# Patient Record
Sex: FEMALE | Race: WHITE | URBAN - METROPOLITAN AREA
[De-identification: names, ages, dates, MRNs, and addresses within clinical notes are randomized per-mention and may not be internally consistent; named-entity substitution may affect disease eponyms.]

---

## 2017-01-01 ENCOUNTER — HOSPITAL ENCOUNTER (EMERGENCY)
Facility: CLINIC | Age: 82
Discharge: HOME | End: 2017-10-03
Attending: EMERGENCY MEDICINE | Admitting: EMERGENCY MEDICINE
Payer: MEDICARE

## 2017-01-01 ENCOUNTER — TELEPHONE (OUTPATIENT)
Dept: FAMILY MEDICINE | Facility: CLINIC | Age: 82
End: 2017-01-01

## 2017-01-01 ENCOUNTER — OFFICE VISIT (OUTPATIENT)
Dept: FAMILY MEDICINE | Facility: CLINIC | Age: 82
End: 2017-01-01
Payer: MEDICARE

## 2017-01-01 ENCOUNTER — APPOINTMENT (OUTPATIENT)
Dept: CT IMAGING | Facility: CLINIC | Age: 82
End: 2017-01-01
Attending: EMERGENCY MEDICINE
Payer: MEDICARE

## 2017-01-01 ENCOUNTER — TRANSFERRED RECORDS (OUTPATIENT)
Dept: HEALTH INFORMATION MANAGEMENT | Facility: CLINIC | Age: 82
End: 2017-01-01

## 2017-01-01 VITALS
RESPIRATION RATE: 20 BRPM | HEIGHT: 59 IN | HEART RATE: 73 BPM | SYSTOLIC BLOOD PRESSURE: 190 MMHG | OXYGEN SATURATION: 99 % | WEIGHT: 125 LBS | BODY MASS INDEX: 25.2 KG/M2 | DIASTOLIC BLOOD PRESSURE: 64 MMHG | TEMPERATURE: 97.6 F

## 2017-01-01 VITALS
RESPIRATION RATE: 14 BRPM | HEART RATE: 67 BPM | WEIGHT: 122 LBS | HEIGHT: 59 IN | TEMPERATURE: 97 F | BODY MASS INDEX: 24.6 KG/M2 | DIASTOLIC BLOOD PRESSURE: 62 MMHG | SYSTOLIC BLOOD PRESSURE: 100 MMHG | OXYGEN SATURATION: 99 %

## 2017-01-01 DIAGNOSIS — Z79.899 ENCOUNTER FOR LONG-TERM (CURRENT) USE OF MEDICATIONS: ICD-10-CM

## 2017-01-01 DIAGNOSIS — N18.30 CKD (CHRONIC KIDNEY DISEASE) STAGE 3, GFR 30-59 ML/MIN (H): ICD-10-CM

## 2017-01-01 DIAGNOSIS — Z53.9 DIAGNOSIS NOT YET DEFINED: Primary | ICD-10-CM

## 2017-01-01 DIAGNOSIS — R73.02 GLUCOSE INTOLERANCE (IMPAIRED GLUCOSE TOLERANCE): ICD-10-CM

## 2017-01-01 DIAGNOSIS — E78.1 HYPERTRIGLYCERIDEMIA: ICD-10-CM

## 2017-01-01 DIAGNOSIS — R47.81 SLURRED SPEECH: Primary | ICD-10-CM

## 2017-01-01 DIAGNOSIS — K55.9 ISCHEMIC COLON (H): ICD-10-CM

## 2017-01-01 DIAGNOSIS — R41.0 CONFUSION: ICD-10-CM

## 2017-01-01 DIAGNOSIS — R41.3 MEMORY LOSS: ICD-10-CM

## 2017-01-01 DIAGNOSIS — I10 BENIGN ESSENTIAL HYPERTENSION: ICD-10-CM

## 2017-01-01 DIAGNOSIS — I10 HYPERTENSION GOAL BP (BLOOD PRESSURE) < 140/80: ICD-10-CM

## 2017-01-01 DIAGNOSIS — Z23 NEED FOR PROPHYLACTIC VACCINATION AND INOCULATION AGAINST INFLUENZA: ICD-10-CM

## 2017-01-01 LAB
ALBUMIN SERPL-MCNC: 3.5 G/DL (ref 3.4–5)
ALBUMIN SERPL-MCNC: 3.6 G/DL (ref 3.4–5)
ALBUMIN UR-MCNC: NEGATIVE MG/DL
ALP SERPL-CCNC: 119 U/L (ref 40–150)
ALP SERPL-CCNC: 133 U/L (ref 40–150)
ALT SERPL W P-5'-P-CCNC: 34 U/L (ref 0–50)
ALT SERPL W P-5'-P-CCNC: 35 U/L (ref 0–50)
ANION GAP SERPL CALCULATED.3IONS-SCNC: 12 MMOL/L (ref 3–14)
ANION GAP SERPL CALCULATED.3IONS-SCNC: 7 MMOL/L (ref 3–14)
APPEARANCE UR: CLEAR
APTT PPP: 25 SEC (ref 22–37)
AST SERPL W P-5'-P-CCNC: 25 U/L (ref 0–45)
AST SERPL W P-5'-P-CCNC: 32 U/L (ref 0–45)
BASOPHILS # BLD AUTO: 0 10E9/L (ref 0–0.2)
BASOPHILS NFR BLD AUTO: 0.4 %
BILIRUB SERPL-MCNC: 0.4 MG/DL (ref 0.2–1.3)
BILIRUB SERPL-MCNC: 0.4 MG/DL (ref 0.2–1.3)
BILIRUB UR QL STRIP: NEGATIVE
BUN SERPL-MCNC: 35 MG/DL (ref 7–30)
BUN SERPL-MCNC: 38 MG/DL (ref 7–30)
CALCIUM SERPL-MCNC: 9 MG/DL (ref 8.5–10.1)
CALCIUM SERPL-MCNC: 9.5 MG/DL (ref 8.5–10.1)
CHLORIDE SERPL-SCNC: 101 MMOL/L (ref 94–109)
CHLORIDE SERPL-SCNC: 101 MMOL/L (ref 94–109)
CHOLEST SERPL-MCNC: 241 MG/DL
CO2 SERPL-SCNC: 23 MMOL/L (ref 20–32)
CO2 SERPL-SCNC: 29 MMOL/L (ref 20–32)
COLOR UR AUTO: YELLOW
CREAT SERPL-MCNC: 1.25 MG/DL (ref 0.52–1.04)
CREAT SERPL-MCNC: 1.31 MG/DL (ref 0.52–1.04)
DIFFERENTIAL METHOD BLD: NORMAL
EOSINOPHIL # BLD AUTO: 0.4 10E9/L (ref 0–0.7)
EOSINOPHIL NFR BLD AUTO: 4.6 %
ERYTHROCYTE [DISTWIDTH] IN BLOOD BY AUTOMATED COUNT: 14.2 % (ref 10–15)
GFR SERPL CREATININE-BSD FRML MDRD: 38 ML/MIN/1.7M2
GFR SERPL CREATININE-BSD FRML MDRD: 40 ML/MIN/1.7M2
GLUCOSE SERPL-MCNC: 118 MG/DL (ref 70–99)
GLUCOSE SERPL-MCNC: 152 MG/DL (ref 70–99)
GLUCOSE UR STRIP-MCNC: NEGATIVE MG/DL
HCT VFR BLD AUTO: 38 % (ref 35–47)
HDLC SERPL-MCNC: 43 MG/DL
HGB BLD-MCNC: 13.1 G/DL (ref 11.7–15.7)
HGB BLD-MCNC: 13.3 G/DL (ref 11.7–15.7)
HGB UR QL STRIP: ABNORMAL
IMM GRANULOCYTES # BLD: 0 10E9/L (ref 0–0.4)
IMM GRANULOCYTES NFR BLD: 0.4 %
INR PPP: 0.94 (ref 0.86–1.14)
INTERPRETATION ECG - MUSE: NORMAL
KETONES UR STRIP-MCNC: NEGATIVE MG/DL
LDLC SERPL CALC-MCNC: 129 MG/DL
LEUKOCYTE ESTERASE UR QL STRIP: NEGATIVE
LYMPHOCYTES # BLD AUTO: 1.5 10E9/L (ref 0.8–5.3)
LYMPHOCYTES NFR BLD AUTO: 17.8 %
MCH RBC QN AUTO: 31.5 PG (ref 26.5–33)
MCHC RBC AUTO-ENTMCNC: 35 G/DL (ref 31.5–36.5)
MCV RBC AUTO: 90 FL (ref 78–100)
MONOCYTES # BLD AUTO: 0.2 10E9/L (ref 0–1.3)
MONOCYTES NFR BLD AUTO: 2.6 %
NEUTROPHILS # BLD AUTO: 6.3 10E9/L (ref 1.6–8.3)
NEUTROPHILS NFR BLD AUTO: 74.2 %
NITRATE UR QL: NEGATIVE
NONHDLC SERPL-MCNC: 198 MG/DL
NRBC # BLD AUTO: 0 10*3/UL
NRBC BLD AUTO-RTO: 0 /100
PH UR STRIP: 5 PH (ref 5–7)
PLATELET # BLD AUTO: 268 10E9/L (ref 150–450)
POTASSIUM SERPL-SCNC: 3.6 MMOL/L (ref 3.4–5.3)
POTASSIUM SERPL-SCNC: 3.6 MMOL/L (ref 3.4–5.3)
PROT SERPL-MCNC: 7.5 G/DL (ref 6.8–8.8)
PROT SERPL-MCNC: 7.6 G/DL (ref 6.8–8.8)
RBC # BLD AUTO: 4.22 10E12/L (ref 3.8–5.2)
RBC #/AREA URNS AUTO: NORMAL /HPF
SODIUM SERPL-SCNC: 136 MMOL/L (ref 133–144)
SODIUM SERPL-SCNC: 137 MMOL/L (ref 133–144)
SOURCE: ABNORMAL
SP GR UR STRIP: 1.02 (ref 1–1.03)
TRIGL SERPL-MCNC: 343 MG/DL
TROPONIN I SERPL-MCNC: 0.02 UG/L (ref 0–0.04)
TSH SERPL DL<=0.005 MIU/L-ACNC: 3.87 MU/L (ref 0.4–4)
UROBILINOGEN UR STRIP-ACNC: 0.2 EU/DL (ref 0.2–1)
WBC # BLD AUTO: 8.5 10E9/L (ref 4–11)
WBC #/AREA URNS AUTO: NORMAL /HPF

## 2017-01-01 PROCEDURE — 85025 COMPLETE CBC W/AUTO DIFF WBC: CPT | Performed by: EMERGENCY MEDICINE

## 2017-01-01 PROCEDURE — 99285 EMERGENCY DEPT VISIT HI MDM: CPT | Mod: 25

## 2017-01-01 PROCEDURE — 85610 PROTHROMBIN TIME: CPT | Performed by: EMERGENCY MEDICINE

## 2017-01-01 PROCEDURE — 84484 ASSAY OF TROPONIN QUANT: CPT | Performed by: EMERGENCY MEDICINE

## 2017-01-01 PROCEDURE — 85018 HEMOGLOBIN: CPT | Performed by: FAMILY MEDICINE

## 2017-01-01 PROCEDURE — 85730 THROMBOPLASTIN TIME PARTIAL: CPT | Performed by: EMERGENCY MEDICINE

## 2017-01-01 PROCEDURE — 80053 COMPREHEN METABOLIC PANEL: CPT | Performed by: EMERGENCY MEDICINE

## 2017-01-01 PROCEDURE — 36415 COLL VENOUS BLD VENIPUNCTURE: CPT | Performed by: FAMILY MEDICINE

## 2017-01-01 PROCEDURE — 81001 URINALYSIS AUTO W/SCOPE: CPT | Performed by: EMERGENCY MEDICINE

## 2017-01-01 PROCEDURE — 70450 CT HEAD/BRAIN W/O DYE: CPT

## 2017-01-01 PROCEDURE — G0179 MD RECERTIFICATION HHA PT: HCPCS | Performed by: FAMILY MEDICINE

## 2017-01-01 PROCEDURE — 99215 OFFICE O/P EST HI 40 MIN: CPT | Mod: 25 | Performed by: FAMILY MEDICINE

## 2017-01-01 PROCEDURE — 99207 C MD CERTIFICATION HHA PATIENT: CPT | Performed by: FAMILY MEDICINE

## 2017-01-01 PROCEDURE — 25000128 H RX IP 250 OP 636: Performed by: EMERGENCY MEDICINE

## 2017-01-01 PROCEDURE — 93005 ELECTROCARDIOGRAM TRACING: CPT

## 2017-01-01 PROCEDURE — 90662 IIV NO PRSV INCREASED AG IM: CPT | Performed by: FAMILY MEDICINE

## 2017-01-01 PROCEDURE — G0008 ADMIN INFLUENZA VIRUS VAC: HCPCS | Performed by: FAMILY MEDICINE

## 2017-01-01 PROCEDURE — 80050 GENERAL HEALTH PANEL: CPT | Performed by: FAMILY MEDICINE

## 2017-01-01 PROCEDURE — 80061 LIPID PANEL: CPT | Performed by: FAMILY MEDICINE

## 2017-01-01 PROCEDURE — G0180 MD CERTIFICATION HHA PATIENT: HCPCS | Performed by: FAMILY MEDICINE

## 2017-01-01 RX ORDER — LOSARTAN POTASSIUM AND HYDROCHLOROTHIAZIDE 25; 100 MG/1; MG/1
TABLET ORAL
Qty: 90 TABLET | Refills: 0 | Status: SHIPPED | OUTPATIENT
Start: 2017-01-01

## 2017-01-01 RX ADMIN — SODIUM CHLORIDE 500 ML: 9 INJECTION, SOLUTION INTRAVENOUS at 17:11

## 2017-02-17 DIAGNOSIS — I10 HYPERTENSION GOAL BP (BLOOD PRESSURE) < 140/80: ICD-10-CM

## 2017-02-17 RX ORDER — LOSARTAN POTASSIUM AND HYDROCHLOROTHIAZIDE 25; 100 MG/1; MG/1
TABLET ORAL
Qty: 90 TABLET | Refills: 2 | Status: SHIPPED | OUTPATIENT
Start: 2017-02-17 | End: 2017-01-01

## 2017-02-17 NOTE — TELEPHONE ENCOUNTER
LOSARTAN-HCTZ 100-25 MG TAB    Last Written Prescription Date: 09/12/2017  Last Fill Quantity: 90, # refills: 0  Last Office Visit with G, P or [facility] prescribing provider: 11/18/2016       Potassium   Date Value Ref Range Status   11/18/2016 4.2 3.4 - 5.3 mmol/L Final     Creatinine   Date Value Ref Range Status   11/18/2016 1.36 (H) 0.52 - 1.04 mg/dL Final     BP Readings from Last 3 Encounters:   11/18/16 122/68   11/01/16 118/70   04/27/16 120/70

## 2017-06-13 NOTE — TELEPHONE ENCOUNTER
Reason for Call:  Form, our goal is to have forms completed with 72 hours, however, some forms may require a visit or additional information.    Type of letter, form or note:  medical    Who is the form from?: Home care    Where did the form come from: form was faxed in    What clinic location was the form placed at?: [facility]    Where the form was placed: 's Box: Rebecca Galloway MD    What number is listed as a contact on the form?: [phone]       Additional comments:   [employer]  interim physician orders     Call taken on 6/13/2017 at 1:42 PM by Sandra Calero

## 2017-06-16 NOTE — TELEPHONE ENCOUNTER
Reason for Call:  Form, our goal is to have forms completed with 72 hours, however, some forms may require a visit or additional information.    Type of letter, form or note:  medical    Who is the form from?: Home care    Where did the form come from: form was faxed in    What clinic location was the form placed at?: [facility]    Where the form was placed: 's Box: Rebecca Galloway MD    What number is listed as a contact on the form?: Fax # [phone]       Additional comments: [employer] - Flexible Scheduling Plan Request (Effective Date: 6/13/17)    Call taken on 6/16/2017 at 9:57 AM by Rodney Velazquez

## 2017-07-14 NOTE — TELEPHONE ENCOUNTER
Reason for Call:  Form, our goal is to have forms completed with 72 hours, however, some forms may require a visit or additional information.    Type of letter, form or note:  medical    Who is the form from?: Home care    Where did the form come from: form was faxed in    What clinic location was the form placed at?: [facility]    Where the form was placed: 's Box: Eduard Kelley MD    What number is listed as a contact on the form?: Fax # [phone]       Additional comments: Service Authorization [employer] (Effective Date: 7/14/17)    Call taken on 7/14/2017 at 3:51 PM by Rodney Velazquez

## 2017-07-17 NOTE — TELEPHONE ENCOUNTER
Reason for Call:  Form, our goal is to have forms completed with 72 hours, however, some forms may require a visit or additional information.    Type of letter, form or note:  medical    Who is the form from?: Home care    Where did the form come from: form was faxed in    What clinic location was the form placed at?: [facility]    Where the form was placed: 's Box: Rebecca Galloway MD    What number is listed as a contact on the form?: Fax # [phone]       Additional comments: Home Health Certificate 6/13/17-8/11/17    Call taken on 7/17/2017 at 11:51 AM by Rodney Velazquez

## 2017-09-19 NOTE — TELEPHONE ENCOUNTER
Reason for Call:  Form, our goal is to have forms completed with 72 hours, however, some forms may require a visit or additional information.    Type of letter, form or note:  medical    Who is the form from?: Home care    Where did the form come from: form was faxed in    What clinic location was the form placed at?: [facility]    Where the form was placed: 's Box: Rebecca Galloway MD    What number is listed as a contact on the form?: Fax # [phone]       Additional comments: Home Health Certification 8/12/17-10/10/17    Call taken on 9/19/2017 at 11:19 AM by Rodney Velazquez

## 2017-10-03 PROBLEM — Z79.899 ENCOUNTER FOR LONG-TERM (CURRENT) USE OF MEDICATIONS: Status: ACTIVE | Noted: 2017-01-01

## 2017-10-03 NOTE — PATIENT INSTRUCTIONS
1. CONT THE  5mgm aricept      2. Cont the citalopram 10mgm     3. See neurology asap  You may need to go to  [facility] ED     As the slurred speech may be the herald of a stroke       PE due in late 11-17

## 2017-10-03 NOTE — ED AVS SNAPSHOT
Emergency Department    [address] [facility] [facility] [address]    Phone:  [phone]    Fax:  [phone]                                       Ely Wills   MRN: [id number]    Department:   Emergency Department   Date of Visit:  10/3/2017           Patient Information     Date Of Birth          1/31/1930        Your diagnoses for this visit were:     Confusion        You were seen by Hiren Lee MD.      Follow-up Information     Follow up with Rebecca Galloway MD In 3 days.    Specialty:  Family Practice    Why:  As needed    Contact information:    [address]  [facility] [id number] [phone]          Discharge Instructions       Please return to the ER with persistant speech that is not understandable or weakness of arm or leg.    Please return with persistant sympotoms to consider MRI since not performed today.    Please follow up with Neurology or your primary care physician to discuss further work up as indicated.      24 Hour Appointment Hotline       To make an appointment at any [facility], call [phone] ([phone]). If you don't have a family doctor or clinic, we will help you find one. [place] clinics are conveniently located to serve the needs of you and your family.             Review of your medicines      Our records show that you are taking the medicines listed below. If these are incorrect, please call your family doctor or clinic.        Dose / Directions Last dose taken    aspirin 81 MG EC tablet   Dose:  81 mg   Quantity:  90 tablet        Take 1 tablet by mouth daily.   Refills:  3        donepezil 5 MG tablet   Commonly known as:  ARIcept   Dose:  0.5 mg   Quantity:  45 tablet        Take 0.5 tablets (2.5 mg) by mouth At Bedtime   Refills:  3        losartan-hydrochlorothiazide 100-25 MG per tablet   Commonly known as:  HYZAAR   Quantity:  90 tablet        TAKE ONE TABLET BY MOUTH ONE TIME DAILY   Refills:  2         metoprolol 200 MG 24 hr tablet   Commonly known as:  TOPROL-XL   Quantity:  90 tablet        TAKE ONE TABLET BY MOUTH ONE TIME DAILY   Refills:  3        NAMENDA PO   Dose:  5 mg        Take 5 mg by mouth daily   Refills:  0                Procedures and tests performed during your visit     CBC with platelets differential    CT Head w/o Contrast    Comprehensive metabolic panel    EKG 12-lead, tracing only    INR    Partial thromboplastin time    Straight cath for urine    Troponin I    UA reflex to Microscopic and Culture    Urine Microscopic      Orders Needing Specimen Collection     None      Pending Results     Date and Time Order Name Status Description    10/3/2017 1704 EKG 12-lead, tracing only Preliminary             Pending Culture Results     No orders found from 10/1/2017 to 10/4/2017.            Pending Results Instructions     If you had any lab results that were not finalized at the time of your Discharge, you can call the ED Lab Result RN at [phone]. You will be contacted by this team for any positive Lab results or changes in treatment. The nurses are available 7 days a week from 10A to 6:30P.  You can leave a message 24 hours per day and they will return your call.        Test Results From Your Hospital Stay        10/3/2017  5:23 PM      Component Results     Component Value Ref Range & Units Status    WBC 8.5 4.0 - 11.0 10e9/L Final    RBC Count 4.22 3.8 - 5.2 10e12/L Final    Hemoglobin 13.3 11.7 - 15.7 g/dL Final    Hematocrit 38.0 35.0 - 47.0 % Final    MCV 90 78 - 100 fl Final    MCH 31.5 26.5 - 33.0 pg Final    MCHC 35.0 31.5 - 36.5 g/dL Final    RDW 14.2 10.0 - 15.0 % Final    Platelet Count 268 150 - 450 10e9/L Final    Diff Method Automated Method  Final    % Neutrophils 74.2 % Final    % Lymphocytes 17.8 % Final    % Monocytes 2.6 % Final    % Eosinophils 4.6 % Final    % Basophils 0.4 % Final    % Immature Granulocytes 0.4 % Final    Nucleated RBCs 0 0 /100 Final    Absolute  Neutrophil 6.3 1.6 - 8.3 10e9/L Final    Absolute Lymphocytes 1.5 0.8 - 5.3 10e9/L Final    Absolute Monocytes 0.2 0.0 - 1.3 10e9/L Final    Absolute Eosinophils 0.4 0.0 - 0.7 10e9/L Final    Absolute Basophils 0.0 0.0 - 0.2 10e9/L Final    Abs Immature Granulocytes 0.0 0 - 0.4 10e9/L Final    Absolute Nucleated RBC 0.0  Final         10/3/2017  5:36 PM      Component Results     Component Value Ref Range & Units Status    INR 0.94 0.86 - 1.14 Final         10/3/2017  5:36 PM      Component Results     Component Value Ref Range & Units Status    PTT 25 22 - 37 sec Final         10/3/2017  5:42 PM      Narrative     CT HEAD WITHOUT CONTRAST  10/3/2017  5:21 PM    HISTORY: Intermittent speech slurred since Thursday.    TECHNIQUE: Scans were obtained through the head without IV contrast.   Radiation dose for this scan was reduced using automated exposure  control, adjustment of the mA and/or kV according to patient size, or  iterative reconstruction technique.    COMPARISON: None.    FINDINGS: Moderately advanced generalized cerebral atrophy. Mild  low-density change in the white matter of both hemispheres consistent  with chronic small vessel ischemic disease. No hemorrhage, mass  lesion, or focal area of acute infarction identified. Paranasal  sinuses are normal. Degenerative changes right mandibular condyle.        Impression     IMPRESSION:   1. Atrophy and chronic white matter disease.  2. Nothing acute.    AMANDA PERDOMO MD         10/3/2017  5:44 PM      Component Results     Component Value Ref Range & Units Status    Sodium 137 133 - 144 mmol/L Final    Potassium 3.6 3.4 - 5.3 mmol/L Final    Chloride 101 94 - 109 mmol/L Final    Carbon Dioxide 29 20 - 32 mmol/L Final    Anion Gap 7 3 - 14 mmol/L Final    Glucose 152 (H) 70 - 99 mg/dL Final    Urea Nitrogen 38 (H) 7 - 30 mg/dL Final    Creatinine 1.31 (H) 0.52 - 1.04 mg/dL Final    GFR Estimate 38 (L) >60 mL/min/1.7m2 Final    Non African American GFR Calc     GFR Estimate If Black 46 (L) >60 mL/min/1.7m2 Final    African American GFR Calc    Calcium 9.0 8.5 - 10.1 mg/dL Final    Bilirubin Total 0.4 0.2 - 1.3 mg/dL Final    Albumin 3.5 3.4 - 5.0 g/dL Final    Protein Total 7.5 6.8 - 8.8 g/dL Final    Alkaline Phosphatase 133 40 - 150 U/L Final    ALT 34 0 - 50 U/L Final    AST 25 0 - 45 U/L Final         10/3/2017  5:45 PM      Component Results     Component Value Ref Range & Units Status    Troponin I ES 0.017 0.000 - 0.045 ug/L Final    The 99th percentile for upper reference range is 0.045 ug/L.  Troponin values   in the range of 0.045 - 0.120 ug/L may be associated with risks of adverse   clinical events.           10/3/2017  6:34 PM      Component Results     Component Value Ref Range & Units Status    Color Urine Yellow  Final    Appearance Urine Clear  Final    Glucose Urine Negative NEG^Negative mg/dL Final    Bilirubin Urine Negative NEG^Negative Final    Ketones Urine Negative NEG^Negative mg/dL Final    Specific Gravity Urine 1.020 1.003 - 1.035 Final    Blood Urine Small (A) NEG^Negative Final    pH Urine 5.0 5.0 - 7.0 pH Final    Protein Albumin Urine Negative NEG^Negative mg/dL Final    Urobilinogen Urine 0.2 0.2 - 1.0 EU/dL Final    Nitrite Urine Negative NEG^Negative Final    Leukocyte Esterase Urine Negative NEG^Negative Final    Source Catheterized Urine  Final         10/3/2017  6:34 PM      Component Results     Component Value Ref Range & Units Status    WBC Urine O - 2 OTO2^O - 2 /HPF Final    RBC Urine O - 2 OTO2^O - 2 /HPF Final                Clinical Quality Measure: Blood Pressure Screening     Your blood pressure was checked while you were in the emergency department today. The last reading we obtained was  BP: 190/64 . Please read the guidelines below about what these numbers mean and what you should do about them.  If your systolic blood pressure (the top number) is less than 120 and your diastolic blood pressure (the bottom number) is  "less than 80, then your blood pressure is normal. There is nothing more that you need to do about it.  If your systolic blood pressure (the top number) is 120-139 or your diastolic blood pressure (the bottom number) is 80-89, your blood pressure may be higher than it should be. You should have your blood pressure rechecked within a year by a primary care provider.  If your systolic blood pressure (the top number) is 140 or greater or your diastolic blood pressure (the bottom number) is 90 or greater, you may have high blood pressure. High blood pressure is treatable, but if left untreated over time it can put you at risk for heart attack, stroke, or kidney failure. You should have your blood pressure rechecked by a primary care provider within the next 4 weeks.  If your provider in the emergency department today gave you specific instructions to follow-up with your doctor or provider even sooner than that, you should follow that instruction and not wait for up to 4 weeks for your follow-up visit.        Thank you for choosing [facility]       Thank you for choosing [facility] for your care. Our goal is always to provide you with excellent care. Hearing back from our patients is one way we can continue to improve our services. Please take a few minutes to complete the written survey that you may receive in the mail after you visit with us. Thank you!        [employer]i Information     [employer]i lets you send messages to your doctor, view your test results, renew your prescriptions, schedule appointments and more. To sign up, go to [email or web address] . Click on \"Log in\" on the left side of the screen, which will take you to the Welcome page. Then click on \"Sign up Now\" on the right side of the page.     You will be asked to enter the access code listed below, as well as some personal information. Please follow the directions to create your username and password.     Your access code is: [email or web address]  Expires: 1/1/2018 " 11:14 AM     Your access code will  in 90 days. If you need help or a new code, please call your [place] clinic or [phone].        Care EveryWhere ID     This is your Care EveryWhere ID. This could be used by other organizations to access your [facility] medical records  [id number]        Equal Access to Services     BEBE IBARRA : Hadkarel quezada Sosony, waaxda luinés, qaybta kaalmanaman lambert, sanjiv huber . So [facility] [phone].    ATENCIÓN: Si habla español, tiene a monet disposición servicios gratuitos de asistencia lingüística. Llame al [phone].    We comply with applicable federal civil rights laws and [place] laws. We do not discriminate on the basis of race, color, national origin, age, disability, sex, sexual orientation, or gender identity.            After Visit Summary       This is your record. Keep this with you and show to your community pharmacist(s) and doctor(s) at your next visit.

## 2017-10-03 NOTE — ED PROVIDER NOTES
"  History     Chief Complaint:  Slurred Speech     History limited due to dementia/alzheimer's and subsequently provided by family.  NABILA Wills is a 87 year old female who presents to the emergency department today for evaluation of slurred speech. Per patient's family, five days ago the patient had slurred speech in the morning which they initially attributed to the patient being fatigued. This alleviated that, has been intermittent since that time. The slurred speech returned two days ago and again yesterday with the patient's full time care taker noting the patient's speech is getting more difficulty and disorientated. Something overall seemed \"off.\" Due to worsening slurred speech compared to normal, family brought her to the clinic and the physician talked to a neurologist and ultimately referred her to the ED for further evaluation. Furthermore, another family noted the patient's tongue was drifted to one side early. Upon presentation, family reports the patient's speech is slurred but at her baseline. Patient states she feels okay and has \"occasional pain.\"     Allergies:  No Known Drug Allergies     Medications:    losartan-hydrochlorothiazide (HYZAAR) 100-25 MG per tablet  metoprolol (TOPROL-XL) 200 MG 24 hr tablet  Memantine HCl (NAMENDA PO)  donepezil (ARICEPT) 5 MG tablet  aspirin 81 MG EC tablet    Past Medical History:    Hypertension  CKD  Diverticulosis  Dementia  Hypertriglyceridemia  Memory loss  Alzheimers  GERD    Past Surgical History:    History reviewed. No pertinent surgical history.    Family History:    Cancer    Social History:  The patient was accompanied to the ED by family.  Smoking Status: Never  Smokeless Tobacco: Never  Alcohol Use: Yes  Marital Status:        Review of Systems   Unable to perform ROS: Dementia     Physical Exam   First Vitals:  BP: 190/64  Pulse: 73  Temp: 97.6  F (36.4  C)  Resp: 20  Height: 149.9 cm (4' 11\")  Weight: 56.7 kg (125 lb)  SpO2: 99 " %      Physical Exam   Constitutional: She appears well-developed.   HENT:   Head: Normocephalic and atraumatic.   Right Ear: External ear normal.   Mouth/Throat: Oropharynx is clear and moist.   Eyes: Conjunctivae and EOM are normal. Pupils are equal, round, and reactive to light.   Neck: Normal range of motion. Neck supple. No JVD present.   Cardiovascular: Normal rate, regular rhythm and normal heart sounds.    Pulmonary/Chest: Effort normal and breath sounds normal.   Abdominal: Soft. Bowel sounds are normal. She exhibits no distension. There is no tenderness. There is no rebound.   Musculoskeletal: Normal range of motion.   Lymphadenopathy:     She has no cervical adenopathy.   Neurological: She is alert. She displays normal reflexes. No cranial nerve deficit. She exhibits normal muscle tone. Coordination normal. GCS eye subscore is 4. GCS verbal subscore is 4. GCS motor subscore is 6.   Skin: Skin is warm and dry.   Psychiatric: She has a normal mood and affect. Her behavior is normal. Judgment normal.   Nursing note and vitals reviewed.    National Institutes of Health Stroke Scale  Exam Interval: Baseline   Score    Level of consciousness: (0)   Alert, keenly responsive    LOC questions: (2)   Answers neither question correctly    LOC commands: (0)   Performs both tasks correctly    Best gaze: (0)   Normal    Visual: (0)   No visual loss    Facial palsy: (0)   Normal symmetrical movements    Motor arm (left): (0)   No drift    Motor arm (right): (0)   No drift    Motor leg (left): (0)   No drift    Motor leg (right): (0)   No drift    Limb ataxia: (0)   Absent    Sensory: (0)   Normal- no sensory loss    Best language: (0)   Normal- no aphasia    Dysarthria: (0)   Normal    Extinction and inattention: (0)   No abnormality        Total Score:  2         Emergency Department Course     ECG:  Indication: Slurred speech  Completed at 1725.  Read at 1730.   Normal sinus rhythm  Normal ECG   Rate 73 bpm. MS  interval 132. QRS duration 76. QT/QTc 442/486. P-R-T axes 48 17 56.    Imaging:  Radiology findings were communicated with the family who voiced understanding of the findings.  CT Head w/o Contrast  IMPRESSION:   1. Atrophy and chronic white matter disease.  2. Nothing acute.  Report per radiology     Laboratory:  Laboratory findings were communicated with the family who voiced understanding of the findings.  CBC: WNL. (WBC 8.5, HGB 13.3, )   INR: 0.94  PTT: 25  CMP: Creatinine 1.31 (H), Glucose 152 (H), BUN 38 (H), GFR Estimate 38 (L) o/w WNL   Troponin (Collected 1700): 0.017    Urine Culture Aerobic Bacterial: Pending    Interventions:  1711 NS Bolus 1,000mL IV     Emergency Department Course:  Nursing notes and vitals reviewed.  The patient was sent for a CT Head w/o Contrast while in the emergency department, results above.   IV was inserted and blood was drawn for laboratory testing, results above.  1700: I performed an exam of the patient as documented above.   1905: Patient rechecked and updated.   Findings and plan explained to the family. Patient discharged home with instructions regarding supportive care, medications, and reasons to return. The importance of close follow-up was reviewed.   I personally reviewed the laboratory and imaging results with the family and answered all related questions prior to discharge.    Impression & Plan      Medical Decision Making:  Patient presents with concerns for speech changes. On examination, there is no weakness. Patient is able to answer questions but does have memory deficit. I did rely on the family quite a bit to assess her speech and they think it's better now. Her CT is negative for ischemia or hemorrhage though patient's symptoms have been going on since Thursday. Ultimately, I did discuss disposition with family and did consider MRI of the brain based on her lack of symptoms here and her being back to normal. Discussed with family further evaluation  of ischemia. Due to patient back to baseline, feel MRI imaging would be difficult for this dementia patient to lie still, and intervention at this point would be aspirin which she is already taking.  This could be TIA, but family  Suspects more progression of dementia and will recommend discharge home and asked to return with worsening condition.      Diagnosis:    ICD-10-CM    1. Confusion R41.0    2. Speech difficulties resolved  3. History of memory issues and dementia    Disposition:  discharged to home    Scribe Disclosure:  I, Caro Lujan, am serving as a scribe at 4:56 PM on 10/3/2017 to document services personally performed by Hiren Lee MD based on my observations and the provider's statements to me.     10/3/2017    EMERGENCY DEPARTMENT       Hiren Lee MD  10/08/17 1370

## 2017-10-03 NOTE — NURSING NOTE
"Chief Complaint   Patient presents with     Memory Loss     /62  Pulse 67  Temp 97  F (36.1  C) (Tympanic)  Resp 14  Ht 4' 11\" (1.499 m)  Wt 122 lb (55.3 kg)  LMP  (LMP Unknown)  SpO2 99%  Breastfeeding? No  BMI 24.64 kg/m2 Estimated body mass index is 24.64 kg/(m^2) as calculated from the following:    Height as of this encounter: 4' 11\" (1.499 m).    Weight as of this encounter: 122 lb (55.3 kg).  BP completed using cuff size: regular   Yulissa Drake CMA    Health Maintenance Due   Topic Date Due     MICROALBUMIN Q1 YEAR  09/06/2012     LIPID MONITORING Q1 YEAR  03/28/2014     INFLUENZA VACCINE (SYSTEM ASSIGNED)  09/01/2017     Health Maintenance reviewed at today's visit patient asked to schedule/complete:   Immunizations:  Patient agrees to schedule    "

## 2017-10-03 NOTE — TELEPHONE ENCOUNTER
Reason for Call:  Form, our goal is to have forms completed with 72 hours, however, some forms may require a visit or additional information.    Type of letter, form or note:  medical    Who is the form from?: Home care    Where did the form come from: form was faxed in    What clinic location was the form placed at?: [facility]    Where the form was placed: 's Box: Rebecca Galloway MD    What number is listed as a contact on the form?: [phone]       Additional comments: the [facility] of [facility] for h& p , medications    Call taken on 10/3/2017 at 9:53 AM by Sandra Calero

## 2017-10-03 NOTE — PROGRESS NOTES
"  SUBJECTIVE:   Ely Wills is a 87 year old female who presents to clinic today for the following health issues:    Memory Loss      Duration: since 2010 -progressive-and seeing neurology since 2012  Last seen 7-17 who gave her celexa 5 mgm and late 9-17 up to 10 mgm & is having slurring since 9-28-17 for a few hrs & has contd to reoccur 2x / day and almost all d yesterday     Description (location/character/radiation): Memory Loss andnow new Speech Problem    Intensity:  moderate    Accompanying signs and symptoms: Diagnosed with Dementia in 2013    History (similar episodes/previous evaluation): None    Precipitating or alleviating factors: None    Therapies tried and outcome: Neurology Consult       SLURRED SPEECH       Duration: recurrent since 9-28-17 for a few mins/ episode 4-5 x a day but 10-2-17 was \"all day \" per d care employees     Description (location/character/radiation): above without change in usual alertness/     Intensity:  mild    Accompanying signs and symptoms: 0    History (similar episodes/previous evaluation): None    Precipitating or alleviating factors: None    Therapies tried and outcome: None        ISCHEMIC COLON        Duration: 1-09    Description (location/character/radiation): resolved     Intensity:  moderate    Accompanying signs and symptoms: --    History (similar episodes/previous evaluation): None    Precipitating or alleviating factors: has ASD    Therapies tried and outcome: None     Glucose Intolerance   Follow-up      Patient is checking blood sugars: not at all    HgbA1C = 6.3 at highest and now 5.1     Diabetic concerns: None     Symptoms of hypoglycemia (low blood sugar): none     Paresthesias (numbness or burning in feet) or sores: No     Date of last diabetic eye exam: 2017      Hypertension Follow-up      Outpatient blood pressures are not being checked.   Here < 140/80    Low Salt Diet: no added salt    Chronic Kidney Disease Follow-up      Current NSAID use?  " "No     Comorbid glu intol, HTN ,       Problem list and histories reviewed & adjusted, as indicated.  Additional history: as documented    Labs reviewed in EPIC    Reviewed and updated as needed this visit by clinical staffAllergies  Meds  Problems       Reviewed and updated as needed this visit by Provider         ROS:  C: NEGATIVE for fever, chills, change in weight  I: NEGATIVE for worrisome rashes, moles or lesions  E: NEGATIVE for vision changes or irritation  E/M: NEGATIVE for ear, mouth and throat problems  R: NEGATIVE for significant cough or SOB  B: NEGATIVE for masses, tenderness or discharge  CV: NEGATIVE for chest pain, palpitations or peripheral edema  GI: NEGATIVE for nausea, abdominal pain, heartburn, or change in bowel habits  : NEGATIVE for frequency, dysuria, or hematuria  M: NEGATIVE for significant arthralgias or myalgia  N: NEGATIVE for weakness, dizziness or paresthesias; normal speech today tho ltd from her [employer]  E: NEGATIVE for temperature intolerance, skin/hair changes  H: NEGATIVE for bleeding problems  PSYCHIATRIC: POSITIVE for, depressed mood, fatigue, hypersomnia and impaired memory    OBJECTIVE:     /62  Pulse 67  Temp 97  F (36.1  C) (Tympanic)  Resp 14  Ht 4' 11\" (1.499 m)  Wt 122 lb (55.3 kg)  LMP  (LMP Unknown)  SpO2 99%  Breastfeeding? No  BMI 24.64 kg/m2  Body mass index is 24.64 kg/(m^2).  GENERAL: healthy, alert and no distress  EYES: Eyes grossly normal to inspection, PERRL and conjunctivae and sclerae normal  RESP: lungs clear to auscultation - no rales, rhonchi or wheezes  CV: regular rate and rhythm, normal S1 S2, no S3 or S4, no murmur, click or rub, no peripheral edema and peripheral pulses strong  MS: no gross musculoskeletal defects noted, no edema  SKIN: no suspicious lesions or rashes  NEURO: Normal strength and tone, mentation intact and speech normal  PSYCH: concentration poor, confused, disoriented, inattentive, affect normal/bright, " fatigued, appearance well groomed and speech = wnl at present     Diagnostic Test Results:  none     ASSESSMENT/PLAN:               ICD-10-CM    1. Slurred speech-recurrent since 9-28-17 R47.81    2. Memory loss since 2010-worse - issue of increasing med  R41.3 Comprehensive metabolic panel     TSH with free T4 reflex   3. Ischemic colon/colonoscopy 1-09 K55.9 Hemoglobin   4. Glucose intolerance (impaired glucose tolerance): Hgb A1C = 6.0 in 4-11 R73.02 Comprehensive metabolic panel   5. Hypertriglyceridemia E78.1 Lipid panel reflex to direct LDL     Comprehensive metabolic panel   6. Benign essential hypertension I10 Comprehensive metabolic panel     CANCELED: Albumin Random Urine Quantitative with Creat Ratio   7. CKD (chronic kidney disease) stage 3, GFR 30-59 ml/min N18.3 Comprehensive metabolic panel     CANCELED: Albumin Random Urine Quantitative with Creat Ratio   8. Need for prophylactic vaccination and inoculation against influenza Z23 FLU VACCINE, INCREASED ANTIGEN, PRESV FREE, AGE 65+ [81670]     Vaccine Administration, Initial [51955]     ADMIN INFLUENZA (For MEDICARE Patients ONLY) []   9. Encounter for long-term (current) use of medications Z79.899 Comprehensive metabolic panel       Patient Instructions   1. CONT THE  5mgm aricept      2. Cont the citalopram 10mgm     3. See neurology asap  You may need to go to  [facility]  If cannot see neurol by tomorrow or if the slurring reoccurs     As the slurred speech may be the herald of a stroke       PE due in late 11-17         Rebecca Galloway MD  [facility]

## 2017-10-03 NOTE — MR AVS SNAPSHOT
After Visit Summary   10/3/2017    Ely Wills    MRN: [id number]           Patient Information     Date Of Birth          1/31/1930        Visit Information        Provider Department      10/3/2017 10:20 AM Rebecca Galloway MD [facility]        Today's Diagnoses     Memory loss since 2010-worse - issue of increasing med     -  1    Ischemic colon/colonoscopy 1-09        Glucose intolerance (impaired glucose tolerance): Hgb A1C = 6.0 in 4-11        Hypertriglyceridemia        Benign essential hypertension        CKD (chronic kidney disease) stage 3, GFR 30-59 ml/min        Need for prophylactic vaccination and inoculation against influenza        Encounter for long-term (current) use of medications          Care Instructions    1. CONT THE  5mgm aricept      2. Cont the citalopram 10mgm     3. See neurology asap  You may need to go to  [facility]     As the slurred speech may be the herald of a stroke       PE due in late 11-17             Follow-ups after your visit        Follow-up notes from your care team     Return in about 6 months (around 4/3/2018).      Who to contact     If you have questions or need follow up information about today's clinic visit or your schedule please contact [facility] directly at [phone].  Normal or non-critical lab and imaging results will be communicated to you by MyChart, letter or phone within 4 business days after the clinic has received the results. If you do not hear from us within 7 days, please contact the clinic through MyChart or phone. If you have a critical or abnormal lab result, we will notify you by phone as soon as possible.  Submit refill requests through [employer] or call your pharmacy and they will forward the refill request to us. Please allow 3 business days for your refill to be completed.          Additional Information About Your Visit        MyChart Information      "[employer] lets you send messages to your doctor, view your test results, renew your prescriptions, schedule appointments and more. To sign up, go to [email or web address] . Click on \"Log in\" on the left side of the screen, which will take you to the Welcome page. Then click on \"Sign up Now\" on the right side of the page.     You will be asked to enter the access code listed below, as well as some personal information. Please follow the directions to create your username and password.     Your access code is: [email or web address]  Expires: 2018 11:14 AM     Your access code will  in 90 days. If you need help or a new code, please call your [place] clinic or [phone].        Care EveryWhere ID     This is your Care EveryWhere ID. This could be used by other organizations to access your [place] medical records  [id number]        Your Vitals Were     Pulse Temperature Respirations Height Last Period Pulse Oximetry    67 97  F (36.1  C) (Tympanic) 14 4' 11\" (1.499 m) (LMP Unknown) 99%    Breastfeeding? BMI (Body Mass Index)                No 24.64 kg/m2           Blood Pressure from Last 3 Encounters:   10/03/17 100/62   16 122/68   16 118/70    Weight from Last 3 Encounters:   10/03/17 122 lb (55.3 kg)   16 120 lb (54.4 kg)   16 123 lb 8 oz (56 kg)              We Performed the Following     Albumin Random Urine Quantitative with Creat Ratio     Comprehensive metabolic panel     Hemoglobin     Lipid panel reflex to direct LDL     TSH with free T4 reflex          Today's Medication Changes          These changes are accurate as of: 10/3/17 11:15 AM.  If you have any questions, ask your nurse or doctor.               These medicines have changed or have updated prescriptions.        Dose/Directions    donepezil 5 MG tablet   Commonly known as:  ARIcept   This may have changed:  how much to take   Used for:  Memory loss        Dose:  0.5 mg   Take 0.5 tablets (2.5 mg) by mouth At Bedtime "   Quantity:  45 tablet   Refills:  3                Primary Care Provider Office Phone # Fax #    Rebecca Maricarmen Galloway [phone]       [address] [facility] [address]        Equal Access to Services     KASHMIRMINERVA FRED AH: Hadii aad ku hadasho Soomaali, waaxda luqadaha, qaybta kaalmada adeegyada, waxfabricio idiin haystoneyn adegrant james lanely phelps. So [facility] [phone].    ATENCIÓN: Si habla español, tiene a monet disposición servicios gratuitos de asistencia lingüística. Llame al [phone].    We comply with applicable federal civil rights laws and [place] laws. We do not discriminate on the basis of race, color, national origin, age, disability, sex, sexual orientation, or gender identity.            Thank you!     [facility] you [facility] your [facility]. Our goal is always to provide you with excellent care. Hearing back from our patients is one way we can continue to improve our services. Please take a few minutes to complete the written survey that you may receive in the mail after your visit with us. Thank you!             Your Updated Medication List - Protect others around you: Learn how to safely use, store and throw away your medicines at www.disposemymeds.org.          This list is accurate as of: 10/3/17 11:15 AM.  Always use your most recent med list.                   Brand Name Dispense Instructions for use Diagnosis    aspirin 81 MG EC tablet     90 tablet    Take 1 tablet by mouth daily.        donepezil 5 MG tablet    ARIcept    45 tablet    Take 0.5 tablets (2.5 mg) by mouth At Bedtime    Memory loss       losartan-hydrochlorothiazide 100-25 MG per tablet    HYZAAR    90 tablet    TAKE ONE TABLET BY MOUTH ONE TIME DAILY    Hypertension goal BP (blood pressure) < 140/80       metoprolol 200 MG 24 hr tablet    TOPROL-XL    90 tablet    TAKE ONE TABLET BY MOUTH ONE TIME DAILY    Hypertension goal BP (blood pressure) < 140/80       NAMENDA PO       Take 5 mg by mouth daily

## 2017-10-03 NOTE — ED AVS SNAPSHOT
Emergency Department    [address] [facility] [address]    Phone:  [phone]    Fax:  [phone]                                       Ely Wills   MRN: [id number]    Department:   Emergency Department   Date of Visit:  10/3/2017           After Visit Summary Signature Page     I have received my discharge instructions, and my questions have been answered. I have discussed any challenges I see with this plan with the nurse or doctor.    ..........................................................................................................................................  Patient/Patient Representative Signature      ..........................................................................................................................................  Patient Representative Print Name and Relationship to Patient    ..................................................               ................................................  Date                                            Time    ..........................................................................................................................................  Reviewed by Signature/Title    ...................................................              ..............................................  Date                                                            Time

## 2017-10-03 NOTE — PROGRESS NOTES
Injectable Influenza Immunization Documentation    1.  Is the person to be vaccinated sick today?   No    2. Does the person to be vaccinated have an allergy to a component   of the vaccine?   No    3. Has the person to be vaccinated ever had a serious reaction   to influenza vaccine in the past?   No    4. Has the person to be vaccinated ever had Guillain-Barré syndrome?   No    Form completed by Yulissa Drake [facility]

## 2017-10-04 PROBLEM — R47.81 SLURRED SPEECH: Status: ACTIVE | Noted: 2017-01-01

## 2017-10-09 NOTE — TELEPHONE ENCOUNTER
Reason for Call:  Form, our goal is to have forms completed with 72 hours, however, some forms may require a visit or additional information.    Type of letter, form or note:  medical    Who is the form from?: Home care    Where did the form come from: form was faxed in    What clinic location was the form placed at?: [facility]    Where the form was placed: 's Box: Rebecca Galloway MD    What number is listed as a contact on the form?: [phone]   Phone # [phone]       Additional comments: PRN Interim physician orders  new meds & procedures    Call taken on 10/9/2017 at 4:31 PM by Sandra Calero

## 2017-10-16 NOTE — PROGRESS NOTES
Please see attached lab results  All of your lab results are normal, except as noted below.    The following are explanations of some of our lab tests    THIS DOES NOT MEAN THAT YOU HAD ALL OF THESE DONE    Please continue on the same medications unless a change is noted above    These are some general explanations for tests:    Hgb is the blood iron level  WBC means White Blood Cells  Platelets are small blood cells that help with forming the blood clots along with other blood factors.  Electrolytes are Sodium, Potassium, Calcium, Magnesium, Phosphorus.  Liver tests are: AST, ALT, Bilirubin, Alkaline Phosphatase.  Kidney tests are Creatinine, GFR.  HDL Cholesterol - is the good cholesterol and it is good to have it high.  LDL cholesterol is the bad cholesterol and it is good to have it low.  It is recommended to have LDL less than 130 for people with hypertension and to have it less than 100 for people with heart disease, diabetes and chronic kidney disease.  Triglycerides are another type of lipid and can also cause heart disease so should be kept low.   Thyroid tests are TSH, T4, T3  Glucose is sugar  A1c is a test that gives us an idea about how well was controlled the diabetes for the last 3 months.   PSA stands for Prostate Specific Antigen and it can be elevated with prostate cancer or prostate inflammation.    Several of these labs are too high and need attention   PLEASE COME IN TO THE CLINIC TO DISCUSS

## 2017-11-13 NOTE — TELEPHONE ENCOUNTER
10-3-17 last OV  Routing refill request to provider for review/approval because:  Labs out of range:  Creatinine 10-3-17 (and others)  BP Readings from Last 6 Encounters:   10/03/17 190/64   10/03/17 100/62   11/18/16 122/68   11/01/16 118/70   04/27/16 120/70   11/17/15 120/60   and BP out of protocol range.

## 2017-11-21 NOTE — TELEPHONE ENCOUNTER
Medication is being filled for 1 time refill only due to:  Patient needs to be seen because is due for physical exam..

## 2017-12-19 NOTE — TELEPHONE ENCOUNTER
Reason for Call:  Form, our goal is to have forms completed with 72 hours, however, some forms may require a visit or additional information.    Type of letter, form or note:  medical    Who is the form from?: Home care    Where did the form come from: form was faxed in    What clinic location was the form placed at?: [facility]    Where the form was placed: 's Box: Rebecca Galloway MD    What number is listed as a contact on the form?: [phone]   Phone [phone]       Additional comments: PRN [facility] 10/11/17 to 12/9/17  and 2 other orders     Call taken on 12/19/2017 at 3:02 PM by Sandra Claero

## 2018-01-01 ENCOUNTER — CARE COORDINATION (OUTPATIENT)
Dept: GERIATRIC MEDICINE | Facility: CLINIC | Age: 83
End: 2018-01-01

## 2018-01-11 PROBLEM — Z76.89 HEALTH CARE HOME: Status: ACTIVE | Noted: 2018-01-01

## 2018-01-15 PROBLEM — Z76.89 HEALTH CARE HOME: Status: RESOLVED | Noted: 2018-01-01 | Resolved: 2018-01-01

## 2018-01-15 NOTE — PROGRESS NOTES
Received notification that [facility] will take the case back and assign to Yobani Thomas for 1/1/18.  Called [facility] and spoke with Anahi and reviewed that they would be getting client and provided them with the previous [facility]  and asked that they reach out to the previous care coordinator.    Called client's daughter back and informed her of when the change was effective.  No further questions.

## 2018-01-15 NOTE — PROGRESS NOTES
Received notification from [facility] that client became effective with [facility] and [facility] on 1/1/18.  Reviewed EPIC and MMIS.  MMIS indicates that client is in a CL.  CMS has attempted to reach out to the previous [facility] worker.  Reviewed dx and noted dementia.  Called client's emergency contact Kristi, her daughter.  Kristi shared that client moved to [facility] in Nov 2017 and is being followed by [facility]ob[facility] Thomas Physician at the facility (Dr. Sky Schmid and Meli Kelley).  Reviewed with daughter that this CM would need to transfer the case back to [facility] with a [employer] assignment and that this CM is not sure if that will go into effect 1/1/18 or 2/1/18.  Kristi shared that the previous [facility] worker contact information, Ambreen Hawkins ([phone]).  Reviewed with the daughter that this CM would keep her posted on the outcome.  Updated CMS of correct clinic.  Sofy Vaughan, [facility]  [phone]